# Patient Record
Sex: MALE | Race: BLACK OR AFRICAN AMERICAN | NOT HISPANIC OR LATINO | Employment: OTHER | URBAN - METROPOLITAN AREA
[De-identification: names, ages, dates, MRNs, and addresses within clinical notes are randomized per-mention and may not be internally consistent; named-entity substitution may affect disease eponyms.]

---

## 2017-11-14 ENCOUNTER — TELEPHONE (OUTPATIENT)
Dept: TRANSPLANT | Facility: CLINIC | Age: 67
End: 2017-11-14

## 2017-12-01 DIAGNOSIS — Z76.82 ORGAN TRANSPLANT CANDIDATE: Primary | ICD-10-CM

## 2017-12-19 ENCOUNTER — HOSPITAL ENCOUNTER (OUTPATIENT)
Dept: RADIOLOGY | Facility: HOSPITAL | Age: 67
Discharge: HOME | End: 2017-12-19
Attending: NURSE PRACTITIONER
Payer: MEDICARE

## 2017-12-19 ENCOUNTER — OFFICE VISIT (OUTPATIENT)
Dept: TRANSPLANT | Facility: CLINIC | Age: 67
End: 2017-12-19
Payer: MEDICARE

## 2017-12-19 ENCOUNTER — CLINICAL SUPPORT (OUTPATIENT)
Dept: INFECTIOUS DISEASES | Facility: CLINIC | Age: 67
End: 2017-12-19
Payer: MEDICARE

## 2017-12-19 VITALS
RESPIRATION RATE: 18 BRPM | TEMPERATURE: 98 F | SYSTOLIC BLOOD PRESSURE: 151 MMHG | BODY MASS INDEX: 36.99 KG/M2 | DIASTOLIC BLOOD PRESSURE: 75 MMHG | OXYGEN SATURATION: 95 % | WEIGHT: 244.06 LBS | HEIGHT: 68 IN | HEART RATE: 72 BPM

## 2017-12-19 DIAGNOSIS — N25.81 SECONDARY HYPERPARATHYROIDISM OF RENAL ORIGIN: ICD-10-CM

## 2017-12-19 DIAGNOSIS — E55.9 VITAMIN D DEFICIENCY DISEASE: ICD-10-CM

## 2017-12-19 DIAGNOSIS — I12.9 RENAL HYPERTENSION: Chronic | ICD-10-CM

## 2017-12-19 DIAGNOSIS — E03.9 HYPOTHYROIDISM, UNSPECIFIED TYPE: ICD-10-CM

## 2017-12-19 DIAGNOSIS — K63.5 POLYP OF COLON, UNSPECIFIED PART OF COLON, UNSPECIFIED TYPE: ICD-10-CM

## 2017-12-19 DIAGNOSIS — Z76.82 ORGAN TRANSPLANT CANDIDATE: ICD-10-CM

## 2017-12-19 DIAGNOSIS — Z01.818 PRE-TRANSPLANT EVALUATION FOR CHRONIC KIDNEY DISEASE: ICD-10-CM

## 2017-12-19 DIAGNOSIS — I25.10 CORONARY ARTERY DISEASE INVOLVING NATIVE CORONARY ARTERY OF NATIVE HEART WITHOUT ANGINA PECTORIS: Chronic | ICD-10-CM

## 2017-12-19 DIAGNOSIS — N18.5 CHRONIC KIDNEY DISEASE (CKD), STAGE V: ICD-10-CM

## 2017-12-19 DIAGNOSIS — Z01.818 PRE-TRANSPLANT EVALUATION FOR CHRONIC KIDNEY DISEASE: Primary | ICD-10-CM

## 2017-12-19 PROCEDURE — 72170 X-RAY EXAM OF PELVIS: CPT | Mod: 26,TXP,, | Performed by: RADIOLOGY

## 2017-12-19 PROCEDURE — 76770 US EXAM ABDO BACK WALL COMP: CPT | Mod: 26,TXP,, | Performed by: RADIOLOGY

## 2017-12-19 PROCEDURE — 99204 OFFICE O/P NEW MOD 45 MIN: CPT | Mod: S$GLB,TXP,, | Performed by: PHYSICIAN ASSISTANT

## 2017-12-19 PROCEDURE — G0010 ADMIN HEPATITIS B VACCINE: HCPCS | Mod: S$GLB,TXP,,

## 2017-12-19 PROCEDURE — 71020 XR CHEST PA AND LATERAL: CPT | Mod: 26,TXP,, | Performed by: RADIOLOGY

## 2017-12-19 PROCEDURE — 99999 PR PBB SHADOW E&M-EST. PATIENT-LVL III: CPT | Mod: PBBFAC,TXP,, | Performed by: INTERNAL MEDICINE

## 2017-12-19 PROCEDURE — 72170 X-RAY EXAM OF PELVIS: CPT | Mod: TC,TXP

## 2017-12-19 PROCEDURE — 90740 HEPB VACC 3 DOSE IMMUNSUP IM: CPT | Mod: S$GLB,TXP,,

## 2017-12-19 PROCEDURE — 76770 US EXAM ABDO BACK WALL COMP: CPT | Mod: TC,TXP

## 2017-12-19 PROCEDURE — 71020 XR CHEST PA AND LATERAL: CPT | Mod: TC,TXP

## 2017-12-19 PROCEDURE — 99204 OFFICE O/P NEW MOD 45 MIN: CPT | Mod: S$GLB,TXP,, | Performed by: TRANSPLANT SURGERY

## 2017-12-19 PROCEDURE — 93978 VASCULAR STUDY: CPT | Mod: TC,TXP

## 2017-12-19 PROCEDURE — 90632 HEPA VACCINE ADULT IM: CPT | Mod: S$GLB,TXP,,

## 2017-12-19 PROCEDURE — 90471 IMMUNIZATION ADMIN: CPT | Mod: S$GLB,TXP,,

## 2017-12-19 PROCEDURE — 99205 OFFICE O/P NEW HI 60 MIN: CPT | Mod: S$GLB,TXP,, | Performed by: INTERNAL MEDICINE

## 2017-12-19 PROCEDURE — 93978 VASCULAR STUDY: CPT | Mod: 26,TXP,, | Performed by: RADIOLOGY

## 2017-12-19 PROCEDURE — 97802 MEDICAL NUTRITION INDIV IN: CPT | Mod: S$GLB,TXP,, | Performed by: DIETITIAN, REGISTERED

## 2017-12-19 PROCEDURE — 90670 PCV13 VACCINE IM: CPT | Mod: S$GLB,TXP,,

## 2017-12-19 PROCEDURE — G0009 ADMIN PNEUMOCOCCAL VACCINE: HCPCS | Mod: S$GLB,TXP,,

## 2017-12-19 RX ORDER — LEVOTHYROXINE SODIUM 25 UG/1
25 TABLET ORAL DAILY
COMMUNITY

## 2017-12-19 RX ORDER — METOPROLOL SUCCINATE 200 MG/1
200 TABLET, EXTENDED RELEASE ORAL DAILY
COMMUNITY

## 2017-12-19 RX ORDER — AMIODARONE HYDROCHLORIDE 200 MG/1
TABLET ORAL DAILY
COMMUNITY

## 2017-12-19 RX ORDER — SPIRONOLACTONE 50 MG/1
50 TABLET, FILM COATED ORAL DAILY
COMMUNITY

## 2017-12-19 RX ORDER — NIFEDIPINE 90 MG/1
90 TABLET, EXTENDED RELEASE ORAL DAILY
COMMUNITY

## 2017-12-19 RX ORDER — HYDRALAZINE HYDROCHLORIDE 100 MG/1
100 TABLET, FILM COATED ORAL 3 TIMES DAILY
COMMUNITY

## 2017-12-19 RX ORDER — FERROUS SULFATE 325(65) MG
325 TABLET, DELAYED RELEASE (ENTERIC COATED) ORAL DAILY
COMMUNITY

## 2017-12-19 RX ORDER — FUROSEMIDE 40 MG/1
40 TABLET ORAL DAILY
COMMUNITY

## 2017-12-19 RX ORDER — ASPIRIN 81 MG/1
81 TABLET ORAL DAILY
COMMUNITY

## 2017-12-19 RX ORDER — ATORVASTATIN CALCIUM 40 MG/1
40 TABLET, FILM COATED ORAL DAILY
COMMUNITY

## 2017-12-19 NOTE — PATIENT INSTRUCTIONS
From your doctor:  Thank you for visiting us today and considering kidney transplantation.     -Please get records from you cardiologists' office (progress notes, any testing)  -Plan to gradually increase your physical activity after discussing with your heart doctor.  -Please feel free to contact us with any questions or concerns.    Regards,  Dr. Kamilah Kelly

## 2017-12-19 NOTE — LETTER
December 20, 2017        Joao Brooks  [address]  [address]  [place] [address]  Phone: [phone]  Fax: [phone]             [facility]  [address]  [place] [address]  Phone: [phone]   Patient: Julius Suazo   MR Number: [id number]   YOB: 1950   Date of Visit: 12/19/2017       Dear Dr. Joao Brooks    Thank you for referring Julius Suazo to me for evaluation. Attached you will find relevant portions of my assessment and plan of care.    If you have questions, please do not hesitate to call me. I look forward to following Julius Suazo along with you.    Sincerely,    Nidhi Wyman MD    Enclosure    If you would like to receive this communication electronically, please contact [email or web address] or [phone] to request [employer] Link access.    [employer] Link is a tool which provides read-only access to select patient information with whom you have a relationship. Its easy to use and provides real time access to review your patients record including encounter summaries, notes, results, and demographic information.    If you feel you have received this communication in error or would no longer like to receive these types of communications, please e-mail [email or web address]

## 2017-12-19 NOTE — PROGRESS NOTES
PHARM.D. PRE-TRANSPLANT NOTE:    This patient's medication therapy was evaluated as part of his pre-transplant evaluation.    The following pharmacologic concerns were noted: none      Current Outpatient Prescriptions   Medication Sig Dispense Refill    amiodarone (PACERONE) 200 MG Tab Take by mouth once daily.      aspirin (ECOTRIN) 81 MG EC tablet Take 81 mg by mouth once daily.      atorvastatin (LIPITOR) 40 MG tablet Take 40 mg by mouth once daily.      ferrous sulfate 325 (65 FE) MG EC tablet Take 325 mg by mouth once daily.      furosemide (LASIX) 40 MG tablet Take 40 mg by mouth once daily.      hydrALAZINE (APRESOLINE) 100 MG tablet Take 100 mg by mouth 3 (three) times daily.      levothyroxine (SYNTHROID) 25 MCG tablet Take 25 mcg by mouth once daily.      linagliptin (TRADJENTA) 5 mg Tab tablet Take 5 mg by mouth once daily.      metoprolol succinate (TOPROL-XL) 200 MG 24 hr tablet Take 200 mg by mouth once daily.      NIFEdipine (PROCARDIA-XL) 90 MG (OSM) 24 hr tablet Take 90 mg by mouth once daily.      spironolactone (ALDACTONE) 50 MG tablet Take 50 mg by mouth once daily.       No current facility-administered medications for this visit.          Currently he is responsible for preparing / administering this patient's medications on a daily basis.  I am available for consultation and can be contacted, as needed by the other members of the Kidney Transplant team.

## 2017-12-19 NOTE — PROGRESS NOTES
Transplant Nephrology  Kidney Transplant Recipient Evaluation    Referring Physician: Joao Brooks  Current Nephrologist: Joao Brooks    Subjective:   CC:  Initial evaluation of kidney transplant candidacy.    HPI:  Mr. Sauzo is a 67 y.o. year old Black or  male who has presented to be evaluated as a potential kidney transplant recipient.  He has advanced kidney disease secondary to diabetic nephropathy and HTN. Patient is currently pre-dialysis. He has a LUE AV fistula for dialysis access, but has not yet started.   He reports he gets tired easily and stays on couch most of the time. He is independent in ADLs, but does not exercise or walk much. He makes a lot of urine and denies any history of  issues such as UTIs or kidney stones. Julius reports no LUTS. He denies h/o prostate problems.  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 He does not follow with podiatry.  His EMR notes CHF, but notes are not clear if this is cardiac in origin; he noted having a lot of swelling that improved when he saw kidney doctor.    Previous Transplant: no    Past Medical and Surgical History: Mr. Suazo  has no past medical history on file.  He has a past surgical history that includes Coronary artery bypass graft (2015); Hernia repair (01/1980); and Tonsillectomy.    Past Social and Family History: Mr. Suazo reports that he has never smoked. He has never used smokeless tobacco. He reports that he drinks alcohol. He reports that he does not use drugs. His family history includes Diabetes in his mother; Hypertension in his father and mother; Kidney disease in his brother.    Review of Systems   Constitutional: Positive for fatigue. Negative for unexpected weight change.   HENT: Negative for hearing loss and mouth sores.    Eyes: Positive for visual disturbance (poor sight).   Respiratory:  "Negative for shortness of breath.    Cardiovascular: Negative for chest pain.   Gastrointestinal: Negative for abdominal pain, nausea and vomiting.   Genitourinary: Negative for decreased urine volume and dysuria.   Musculoskeletal: Positive for back pain. Negative for gait problem.   Skin: Negative for rash.   Neurological: Negative for seizures.   Hematological: Does not bruise/bleed easily.   Psychiatric/Behavioral: Negative for sleep disturbance.       Objective:   Blood pressure (!) 151/75, pulse 72, temperature 97.9 °F (36.6 °C), temperature source Oral, resp. rate 18, height 5' 8.11" (1.73 m), weight 110.7 kg (244 lb 0.8 oz), SpO2 95 %.body mass index is 36.99 kg/m².    Physical Exam   Constitutional: He is oriented to person, place, and time. He is cooperative. No distress.   HENT:   Mouth/Throat: Mucous membranes are normal. No oral lesions.   Eyes: Conjunctivae and lids are normal. No scleral icterus.   Neck: Trachea normal. Neck supple. No JVD present. No thyroid mass present.   Cardiovascular: Normal rate and regular rhythm.    Pulmonary/Chest: Effort normal. He has no rales.   Abdominal: Soft. He exhibits no mass. There is no hepatosplenomegaly. There is no tenderness. There is no CVA tenderness.   abd obese and protuberant   Musculoskeletal: Normal range of motion. He exhibits no edema.   Neurological: He is alert and oriented to person, place, and time. He displays no tremor. Gait normal.   Skin: Skin is warm and dry. No lesion and no rash noted. No cyanosis. Nails show no clubbing.   Psychiatric: He has a normal mood and affect. His speech is normal. Cognition and memory are normal.     Labs:  Lab Results   Component Value Date    WBC 8.03 12/19/2017    HGB 12.2 (L) 12/19/2017    HCT 37.5 (L) 12/19/2017     12/19/2017    K 4.9 12/19/2017     12/19/2017    CO2 21 (L) 12/19/2017    BUN 63 (H) 12/19/2017    CREATININE 5.6 (H) 12/19/2017    EGFRNONAA 9.7 (A) 12/19/2017    CALCIUM 9.0 " 12/19/2017    PHOS 4.1 12/19/2017    ALBUMIN 3.5 12/19/2017    AST 18 12/19/2017    ALT 27 12/19/2017    .0 (H) 12/19/2017   Labs were reviewed with the patient.    Assessment:     1. Pre-transplant evaluation for chronic kidney disease    2. Chronic kidney disease (CKD), stage V    3. Coronary artery disease involving native coronary artery of native heart without angina pectoris    4. Renal hypertension    5. Secondary hyperparathyroidism of renal origin    6. Vitamin D deficiency disease        Plan:     Transplant Candidacy:   Based on available information, Mr. Suazo is a high-risk kidney transplant candidate r/t age, CAD, lack of mobility. To his credit, he is motivated to improve his functional status and has been asked to get clearance from his heart doctor prior to increasing his physical activity.  -Records from cardiologist and past colonoscopy will be requested.  Final determination of transplant candidacy will be made once workup is complete and reviewed by the selection committee.    MD TONEY Partida Patient Status  Functional Status: 50% - Requires considerable assistance and frequent medical care  Physical Capacity: No Limitations though does not walk much

## 2017-12-19 NOTE — PROGRESS NOTES
Transplant Surgery  Kidney Transplant Recipient Evaluation    Referring Physician: Joao Brooks  Current Nephrologist: Joao Brooks    Subjective:     Reason for Visit: evaluate transplant candidacy    History of Present Illness: Julius Suazo is a 67 y.o. year old male undergoing transplant evaluation.    Dialysis History: Julius is pre-dialysis.      Transplant History: N/A    Etiology of Renal Disease: Hypertensive Nephrosclerosis (based on medical records from referral).    Review of Systems   Constitutional: Negative for activity change and appetite change.   HENT: Negative for congestion and tinnitus.    Eyes: Negative for redness and visual disturbance.   Respiratory: Negative for cough and shortness of breath.    Cardiovascular: Negative for chest pain and palpitations.   Gastrointestinal: Negative for abdominal distention and abdominal pain.   Endocrine: Negative for polydipsia and polyuria.   Genitourinary: Negative for decreased urine volume and dysuria.   Musculoskeletal: Negative for arthralgias and back pain.   Skin: Negative for pallor and rash.   Allergic/Immunologic: Negative for environmental allergies and immunocompromised state.   Neurological: Negative for tremors and headaches.   Hematological: Negative for adenopathy. Does not bruise/bleed easily.   Psychiatric/Behavioral: Negative for behavioral problems and confusion.       Objective:     Physical Exam:  Constitutional:   Vitals reviewed: yes   Well-nourished and well-groomed: yes  Eyes:   Sclerae icteric: no   Extraocular movements intact: yes  GI:    Bowel sounds normal: yes   Tenderness: no    If yes, quadrant/location: not applicable   Palpable masses: no    If yes, quadrant/location: not applicable   Hepatosplenomegaly: no   Ascites: no   Hernia: no    If yes, type/location: not applicable   Surgical scars: no    If yes, type/location: not applicable  Resp:   Effort normal: yes   Breath sounds normal: yes    CV:   Regular  rate and rhythm: yes   Heart sounds normal: yes   Femoral pulses normal: yes   Extremities edematous: no  Skin:   Rashes or lesions present: no    If yes, describe:not applicable   Jaundice:: no    Musculoskeletal:   Gait normal: yes   Strength normal: yes  Psych:   Oriented to person, place, and time: yes   Affect and mood normal: yes    Additional comments: Sternotomy from triple bypass    Counseling: We provided Julius Suazo with a group education session today.  We discussed kidney transplantation at length with him, including risks, potential complications, and alternatives in the management of his renal failure.  The discussion included complications related to anesthesia, bleeding, infection, primary nonfunction, and ATN.  I discussed the typical postoperative course, length of hospitalization, the need for long-term immunosuppression, and the need for long-term routine follow-up.  I discussed living-donor and -donor transplantation and the relative advantages and disadvantages of each.  I also discussed average waiting times for both living donation and  donation.  I discussed national and center-specific survival rates.  I also mentioned the potential benefit of multicenter listing to candidates listed with centers within more than one organ procurement organization.  All questions were answered.    Final determination of transplant candidacy will be made once evaluation is complete and reviewed by the Kidney & Kidney/Pancreas Selection Committee.         Transplant Surgery - Candidacy   Assessment/Plan:   Julius Suazo is pre-dialysis with CKD stage 4 (GFR 15-29 mL/min). I see no surgical contraindication to placing a kidney transplant. Based on available information, Julius Suazo is a suitable kidney transplant candidate. I strongly encouraged weight loss and living donation if he is approved for transplant.    Garcia Lyles MD

## 2017-12-19 NOTE — PROGRESS NOTES
INITIAL PATIENT EDUCATION NOTE    Mr. Julius Suazo was seen in pre-kidney transplant clinic for evaluation for kidney, kidney/pancreas or pancreas only transplant.  The patient attended a group education session that discussed/reviewed the following aspects of transplantation: evaluation and selection committee process, UNOS waitlist management/multiple listings, types of organs offered (KDPI < 85%, KDPI > 85%, PHS increased risk, DCD), financial aspects, surgical procedures, dietary instruction pre- and post-transplant, health maintenance pre- and post-transplant, post-transplant hospitalization and outpatient follow-up, potential to participate in a research protocol, and medication management and side effects.  A question and answer session was provided after the presentation.    The patient was seen by all members of the multi-disciplinary team to include: Nephrologist/PA, Surgeon, , Transplant Coordinator, , Pharmacist and Dietician (if applicable).    The patient reviewed and signed all consents for evaluation which were witnessed and sent to scanning into the EPIC chart.    The patient was given an education book and plan for further evaluation based on his individual assessment.      The patient was encouraged to call with any questions or concerns.

## 2017-12-19 NOTE — PROGRESS NOTES
"TRANSPLANT NUTRITIONAL ASSESSMENT    Referring Provider: Nidhi Fitzgerald MD    Reason for Visit: Pre-kidney transplant work-up (pre-dialysis)    Age: 67 y.o.  Sex: male    Patient Active Problem List   Diagnosis    CKD stage V from diabetic nephropathy    CAD of native coronary artery of native heart    Renal hypertension    Secondary hyperparathyroidism of renal origin    Vitamin D deficiency disease    Colon polyp 8/2017    Hypothyroidism     Past Medical History:   Diagnosis Date    CHF (congestive heart failure)     Hypothyroidism      Lab Results   Component Value Date     (H) 12/19/2017    K 4.9 12/19/2017    PHOS 4.1 12/19/2017    CHOL 121 12/19/2017    HDL 37 (L) 12/19/2017    TRIG 96 12/19/2017    ALBUMIN 3.5 12/19/2017    HGBA1C 5.9 (H) 12/19/2017    CALCIUM 9.0 12/19/2017       Current Outpatient Prescriptions   Medication Sig    amiodarone (PACERONE) 200 MG Tab Take by mouth once daily.    aspirin (ECOTRIN) 81 MG EC tablet Take 81 mg by mouth once daily.    atorvastatin (LIPITOR) 40 MG tablet Take 40 mg by mouth once daily.    ferrous sulfate 325 (65 FE) MG EC tablet Take 325 mg by mouth once daily.    furosemide (LASIX) 40 MG tablet Take 40 mg by mouth once daily.    hydrALAZINE (APRESOLINE) 100 MG tablet Take 100 mg by mouth 3 (three) times daily.    levothyroxine (SYNTHROID) 25 MCG tablet Take 25 mcg by mouth once daily.    linagliptin (TRADJENTA) 5 mg Tab tablet Take 5 mg by mouth once daily.    metoprolol succinate (TOPROL-XL) 200 MG 24 hr tablet Take 200 mg by mouth once daily.    NIFEdipine (PROCARDIA-XL) 90 MG (OSM) 24 hr tablet Take 90 mg by mouth once daily.    spironolactone (ALDACTONE) 50 MG tablet Take 50 mg by mouth once daily.     No current facility-administered medications for this visit.      Allergies: Patient has no known allergies.    Ht Readings from Last 1 Encounters:   12/19/17 5' 8.11" (1.73 m)     Wt Readings from Last 1 Encounters:   12/19/17 " 110.7 kg (244 lb 0.8 oz)      BMI: Body mass index is 36.99 kg/m².    Weight Change/Time: no significant wt change reported  Current Diet: renal, diabetic diet per pt  Appetite/Current Intake: good   Exercise/Physical Activity: sedentary, but plans to start exercising  Nutritional/Herbal Supplements: iron  Chewing/Swallowing Problems: none reported  Symptoms: none    Estimated Kcal Need: 2200kcals/day (20kcals/kg BW for wt loss)  Estimated Protein Need: 88gms protein/day (0.8gms/kg BW)    Nutritional History: Pt here today with his wife, Dennise.  Dennise prepares meals and grocery shops.  Does not use salt in cooking, typically uses herbs and spices to season foods.  Tries to limit intake of high Na foods but does report consumption of processed meats.  Dining out/fast food: consumes fast foods approx twice per week.  SMBG: every morning, averages approx 130    Diet Recall:  B: grits, sausage, eggs OR [employer] sausage burrito, coffee/juice/or water  Mid-morning snack: sometimes may have fresh fruit (strawberries/grapes/cantaloupe) or fruit cup  L: leftovers from night before, water or ginger ale  Afternoon snack: fresh fruit  D: red beans with sausage, rice, dinner roll OR fried or baked chicken, greens/green beans/or salad, water or ginger ale      Educational Need? yes  Barriers: none identified  Discussed with: patient and wife  Interventions: Patient taught nutrition information regarding   -Diabetes and CKD Nutrition Therapy reviewed ( high/low food sources of K, Phos and protein, low sodium and fluid intake, emphasis on moderate protein intake, basic DM guidelines, carb counting, 2-day sample meal plan)   Goals/Recommendations: diet adherence, choose healthy options when dining out, aerobic exercises as medically able and limit intake of sugar-sweetened beverages and high Na processed meats  Actions Taken: instruct/provide written information  Patient and/or family comprehend instructions: yes  Outcome:  Verbalizes understanding  Monitoring: contact information provided, will follow-up as needed    Counseling Time: 15 minutes

## 2017-12-19 NOTE — PROGRESS NOTES
Pt received his immunizations (Hepatitis A, Hepatitis B high dose, Prevnar 13). Pt has an Rx to receive the rest of his immunizations at a facility closer to home. Pt tolerated injections well. Pt left the unit in NAD.

## 2017-12-19 NOTE — PROGRESS NOTES
Pre Transplant Infectious Diseases Consult  Kidney Transplant Recipient Evaluation    Requesting Physician: Joao Brooks    Reason for Visit:    Chief Complaint   Patient presents with    Kidney Transplant Pre-evaluation     History of Present Illness  Julius Suazo is a 67 y.o. year old Black or  male with advanced Kidney disease currently being evaluated for Kidney transplant. Etiology of kidney disease is HTN and DM. Pt is pre dialysis.  Patient denies any recent fever, chills, or infective illnesses.      1) Do you have a history of:   YES NO   Diabetes      [x] []     Diabetic Foot Infection/Bone Infection  []        [x]     Surgical Removal of Spleen   []  [x]                  2) Have you had recurrent infections involving:             YES NO  Sinus infections  []         [x]   Sore Throat   []         [x]                 Prostate Infections  []         [x]              Bladder Infections  []         [x]                     Kidney Infections  []         [x]                               Intestinal Infections  []         [x]      Skin Infections   []         [x]       Reproductive Infections          []  [x]   Periodontal Disease  []         [x]        3)Have you ever had: YES     NO UNKNOWN       Chicken Pox   [x]         []  []   Shingles   []         [x]  []   Orolabial Herpes             []  [x]  []   Genital Herpes  []         [x]  []   Cytomegalovirus  []         [x]  []   Angelo-Barr Virus  []         [x]  []   Genital Warts   []         [x]  []   Hepatitis A   []         [x]  []   Hepatitis B   []         [x]  []   Hepatitis C   []         [x]  []   Syphilis   []         [x]  []   Gonorrhea   []         [x]  []   Chlamydia Infection  []         [x]  []   Intestinal parasites   or worms   []         [x]  []   Fungal Infections  []         [x]  []   Blood Infections  []         [x]  []       Comment:     4) Have you ever been exposed   YES NO  To someone with tuberculosis?  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 Pt was treated for 12 months. Pt was treated in [facility].     5) What states have you lived in? [place]     6) What countries have you visited for more than 2 weeks? none                        YES NO  7) Did you have any associated infections?  []  [x]       8) Are you planning to travel outside the    []  [x]   [place] after your transplant?    9) Household                   YES NO  Do you have pets living in your house?    []         [x]   If yes, describe:     Do you spend time or live on a farm or     []         [x]   have livestock or other farm animals?  If yes, which ones:    Do you have a fish tank?          []  [x]       Do you have a litter box?      []         [x]     Do you fish or hunt?       []         [x]     Do you clean or skin fish or animals?    []         [x]     Do you consume raw or undercooked    []         [x]   meat, fish, or shellfish?      10) What occupations have you had? none    11) Patient reports hobby to be none          12)Do you garden or otherwise  YES NO   work in the soil?    []         [x]   13)Do you hike, camp, or spend     time in wooded areas?   []         [x]        14) The patient's immunization history was reviewed.    Have you ever received:  YES NO UNKNOWN DATES   Routine Childhood vaccines  [x]         []  []      Influenza vaccine   [x]  []  []    Pneumovax    [x]  []  [] 10/11/15    Tetanus-diptheria   [x]         []  [] 10/11/15   Hepatitis A vaccine series       []  [x]  []    Hepatitis B vaccine series         []  [x]  []    Meningitis vaccine   []         [x]  []    Varicella vaccine   []         [x]  []        Based on the patients immunization history and serologies, immunizations were ordered:         Ordered  Not Ordered  Influenza Vaccine     []    [x]   Hepatitis A series at 0,  6 months   [x]    []   Hepatitis B seriesat 0, 1, and 6  months  []    [x]   Hepatitis B High Dose 0,1, and 6 months  [x]    []   Prevnar      [x]    []   Pneumovax      []    [x]    TDap       []    [x]    Zoster       []    [x]   Menactra      []    [x]            The patient was encouraged to contact us about any problems that may develop after immunization and possible side effects were reviewed.      Previous Transplant: no    Etiology of Kidney Disease: HTN and DM     Allergies: Patient has no known allergies.    There is no immunization history on file for this patient.  No past medical history on file.  No past surgical history on file.   Social History     Social History    Marital status:      Spouse name: N/A    Number of children: N/A    Years of education: N/A     Occupational History    Not on file.     Social History Main Topics    Smoking status: Not on file    Smokeless tobacco: Not on file    Alcohol use Not on file    Drug use: Unknown    Sexual activity: Not on file     Other Topics Concern    Not on file     Social History Narrative    No narrative on file       Review of Systems   Constitution: Negative for chills, decreased appetite, fever, weakness, malaise/fatigue, night sweats, weight gain and weight loss.   HENT: Negative for congestion, ear pain, hearing loss, hoarse voice, sore throat and tinnitus.    Eyes: Negative for blurred vision, pain, vision loss in left eye, vision loss in right eye and visual disturbance.   Cardiovascular: Negative for chest pain, dyspnea on exertion, leg swelling and palpitations.   Respiratory: Negative for cough, shortness of breath, sputum production and wheezing.    Skin: Negative for dry skin, itching, rash and suspicious lesions.   Musculoskeletal: Negative for back pain, joint pain, myalgias and neck pain.   Gastrointestinal: Negative for abdominal pain, constipation, diarrhea, heartburn, nausea and vomiting.   Genitourinary: Negative for dysuria, flank pain, frequency, hematuria, hesitancy  and urgency.   Neurological: Negative for dizziness, headaches, numbness and paresthesias.   Psychiatric/Behavioral: Negative for depression and memory loss. The patient does not have insomnia and is not nervous/anxious.      Physical Exam   Constitutional: He is oriented to person, place, and time. He appears well-developed and well-nourished. No distress.   HENT:   Head: Normocephalic and atraumatic.   Mouth/Throat: Oropharynx is clear and moist.   Eyes: EOM are normal. Pupils are equal, round, and reactive to light.   Neck: Normal range of motion. Neck supple.   Cardiovascular: Normal rate, regular rhythm, normal heart sounds and intact distal pulses.  Exam reveals no gallop and no friction rub.    No murmur heard.  Pulmonary/Chest: Effort normal and breath sounds normal. No respiratory distress. He has no wheezes. He has no rales. He exhibits no tenderness.   Abdominal: Bowel sounds are normal. He exhibits no distension and no mass. There is no tenderness. There is no guarding.   Musculoskeletal: Normal range of motion. He exhibits no edema or deformity.   Neurological: He is alert and oriented to person, place, and time.   Skin: Skin is warm and dry. No rash noted. He is not diaphoretic. No erythema.   Right foot hallux with wound, no active drainage, nontender, no surrounding swelling or cellulitis. No foul odor.    Psychiatric: He has a normal mood and affect. His behavior is normal. Judgment and thought content normal.   Nursing note and vitals reviewed.    Diagnostics: No results found for: RPR  No results found for: CMVANTIBODIE  No results found for: HIV1X2  No results found for: HTLVIIIANTIB  No results found for: HEPBSAG  No results found for: HEPBCAB  No results found for: HEPCAB  No results found for: TOXOIGG  No components found for: TOXOIGGINTER  No results found for: LFI9CKT  No results found for: VTA1MHY  No results found for: VARICELLAZOS  No results found for: VARICELLAINT  No results found for:  STRONGANTIGG  No results found for: EPSTEINBARRV  No results found for: HEPBSAB  No results found for: QUANTIFERON  No results found for: HEPAIGM  No results found for: PPD  No results found for this or any previous visit.         Transplant Infectious Diseases - Candidacy    Assessment/Plan:     Transplant Candidacy: Based on available information, there are no identified significant barriers to transplantation from an infectious disease standpoint pending acceptable serologies.  Final determination of transplant candidacy will be made once evaluation is complete and reviewed by the Transplant Selection Committee.    Quantiferon gold, HIV, Strongyloides, and RPR pending. If positive, please refer to ID clinic.    Pt with right hallux wound, no active signs of infection at this time. Recommend follow up with podiatry for evaluation of right hallux wound and management.      Vaccines today:  Hepatitis A first dose. 2nd dose due in 6 months  Hepatitis B first dose. 2nd dose due in 1 month, 3rd dose due in 6 months.  Katerin Szymanski PA-C         Counseling:I discussed with Julius the risk for increased susceptibility to infections following transplantation including increased risk for infection right after transplant and if rejection should occur.  The patients has been counseled on the importance of vaccinations including but not limited to a yearly flu vaccine.  Specific guidance has been provided to the patient regarding the patients occupation, hobbies and activities to avoid future infectious complications including but not limited to avoiding undercooked meats and seafood, proper hygiene, and contact with animals.

## 2017-12-20 NOTE — PROGRESS NOTES
Transplant Recipient Adult Psychosocial Assessment    Julius [place] [address]    Telephone Information:   Mobile [phone]   Home  [phone] (home)  Work  There is no work phone number on file.  E-mail  No e-mail address on record    Sex: male  YOB: 1950  Age: 67 y.o.    Encounter Date: 2017  [place] Citizen: yes  Primary Language: English   Needed: no    Emergency Contact:  Name: Marcelle Suazo  Relationship: wife  Address: same as pt.  Phone Numbers:  [phone] (home), [phone] (mobile)    Family/Social Support:   Number of dependents/: Pt reports no dependents.  Marital history: Pt reports 1 marriage of 42 years to Marcelle Suazo.  Other family dynamics: Pt reports 3 adult children: Olena, Osmin, and Geovanna; and parents: Brenda and Garcia Suazo (age 90). Pt reports that parents require no assistance, but other family members can help if needed. Pt reports only brother is .    Household Composition:  Name: Julius suazo  Age: 67  Relationship: patient  Does person drive? yes    Name: Marcelle Suazo  Age: 65  Relationship: wife  Does person drive? yes    Do you and your caregivers have access to reliable transportation? yes  PRIMARY CAREGIVER: Marcelle Suazo (wife) will be primary caregiver, phone number [phone].      provided in-depth information to patient and caregiver regarding pre- and post-transplant caregiver role.   strongly encourages patient and caregiver to have concrete plan regarding post-transplant care giving, including back-up caregiver(s) to ensure care giving needs are met as needed.    Patient and Caregiver states understanding all aspects of caregiver role/commitment and is able/willing/committed to being caregiver to the fullest extent necessary.    Patient and Caregiver verbalizes understanding of the education provided today and caregiver responsibilities.          remains available. Patient and Caregiver agree to contact  in a timely manner if concerns arise.      Able to take time off work without financial concerns: yes, pt's wife is retired.     Additional Significant Others who will Assist with Transplant:  Name: Geovanna Suazo  Age: 40  Phone: [phone]  City: [place] State: [place]  Relationship: daughter  Does person drive? yes    Living Will: no  Healthcare Power of : no  Advance Directives on file: <<no information> per medical record.  Verbally reviewed LW/HCPA information.   provided patient with copy of LW/HCPA documents and provided education on completion of forms.    Living Donors: No. Education and resource information given to patient.    Highest Education Level: Associate/Bachelor Degree  Reading Ability: college  Reports difficulty with: seeing and pt reports eyesight gets blurry sometimes  Learns Best By:  Pt reports pt learns best by a combination of verbal, written, and tactile instructions.     Status: no  VA Benefits: no     Working for Income: No  If no, reason not working: Patient Choice - Retired  Patient is retired from [employer] as a ..    Spouse/Significant Other Employment: Pt's wife reports being retired from [email or web address] in [place], [place] as a Payroll person    Disabled: no    Monthly Income:  alf: $250  SS Reitrement: $1800  Other household members total: $946     Able to afford all costs now and if transplanted, including medications: yes Pt reports family and friends can assist if necessary  Patient and Caregiver verbalizes understanding of personal responsibilities related to transplant costs and the importance of having a financial plan to ensure that patients transplant costs are fully covered.       provided fundraising information/education. Patient and Caregiververbalizes understanding.   remains  available.    Insurance:   Payor/Plan Subscr  Sex Relation Sub. Ins. ID Effective Group Num   1. [employer] MANAGE* APURVA VERMA 1950 Male  [id number] 10/1/15 [id number]                                   [address] [address]     Primary Insurance (for UNOS reporting): Public Insurance - Medicare & Choice  Secondary Insurance (for UNOS reporting): None  Patient and Caregiver verbalizes clear understanding that patient may experience difficulty obtaining and/or be denied insurance coverage post-surgery. This includes and is not limited to disability insurance, life insurance, health insurance, burial insurance, long term care insurance, and other insurances.      Patient and Caregiver also reports understanding that future health concerns related to or unrelated to transplantation may not be covered by patient's insurance.  Resources and information provided and reviewed.     Patient and Caregiver provides verbal permission to release any necessary information to outside resources for patient care and discharge planning.  Resources and information provided are reviewed.      Dialysis Adherence: Patient reports being pre-dialysis and attending all appointments. AURORA Zacarias at pt's nephrology office reports over last three months that the patient has kept up with his medications, comes to his labs, attends his appointments and can make informed decisions.    Infusion Service: patient utilizing? no  Home Health: patient utilizing? no  DME: yes BP Cuff and glucometer  Pulmonary/Cardiac Rehab: Pt denies   ADLS:  Pt reports no difficulties with driving, walking, bathing, cooking, housekeeping, eating, shopping, and taking medication.    Adherence:   Pt reports good adherence with medications, dialysis, and health regimen.  Adherence education and counseling provided.     Per History Section:  Past Medical History:   Diagnosis Date    CHF (congestive heart failure)     Hypothyroidism      Social History   Substance Use  Topics    Smoking status: Never Smoker    Smokeless tobacco: Never Used    Alcohol use Yes      Comment: Pt reports previous heavy use of over 5 drinks a day for about 20-30 years. Pt reports that he cut back on his own after having heart surgery in Feb. 2015. Pt reports now drinking 1-2 drinks on the weekend.     History   Drug Use No     History   Sexual Activity    Sexual activity: Not on file       Per Today's Psychosocial:  Tobacco: none, patient denies any use.  Alcohol: Pt reports previous heavy use of over 5 drinks a day for about 20-30 years. Pt reports that he cut back on his own after having heart surgery in Feb. 2015. Pt reports now drinking 1-2 drinks on the weekend. .  Illicit Drugs/Non-prescribed Medications: none, patient denies any use.    Patient and Caregiver states clear understanding of the potential impact of substance use as it relates to transplant candidacy and is aware of possible random substance screening.  Substance abstinence/cessation counseling, education and resources provided and reviewed.     Arrests/DWI/Treatment/Rehab: patient denies    Psychiatric History:    Mental Health: Pt reports that he does not engage in the activities that he used to since he retired. Pt's wife reports that part of the issue is that pt is not able to hang out with the frineds that he used to and reports that the other friends he has work. SW, pt, and wife discussed engaging in more enjoyable activities and getting out of the house more. Patient and Caregiver verbalized understanding and agreement. SW also encouraged pt and wife to seek out assistance if pt feels that these feelings are overwhelming or start interfering with pt's life and health. Patient and Caregiver verbalized understanding and agreement.   Psychiatrist/Counselor: Pt denies seeing a mental health professional and reports being open to seeing the psych department for talk therapy if necessary.  Medications:  Pt denies taking  medications for mental health reasons.  Suicide/Homicide Issues: Pt denies any history of or current suicidal or homicidal ideations.    Safety at home: Pt reports no current or history of safety concerns in household; including mental, physical, verbal, or sexual abuse.    Knowledge: Patient and Caregiver states having clear understanding and realistic expectations regarding the potential risks and potential benefits of organ transplantation and organ donation and agrees to discuss with health care team members and support system members, as well as to utilize available resources and express questions and/or concerns in order to further facilitate the pt informed decision-making.  Resources and information provided and reviewed.    Patient and Caregiver is aware of [facility]'s affiliation and/or partnership with agencies in home health care, LTAC, SNF, [facility], and other hospitals and clinics.    Understanding: Patient and Caregiver reports having a clear understanding of the many lifetime commitments involved with being a transplant recipient, including costs, compliance, medications, lab work, procedures, appointments, concrete and financial planning, preparedness, timely and appropriate communication of concerns, abstinence (ETOH, tobacco, illicit non-prescribed drugs), adherence to all health care team recommendations, support system and caregiver involvement, appropriate and timely resource utilization and follow-through, mental health counseling as needed/recommended, and patient and caregiver responsibilities.  Social Service Handbook, resources and detailed educational information provided and reviewed.  Educational information provided.    Patient and Caregiver also reports current and expected compliance with health care regime and states having a clear understanding of the importance of compliance.      Patient and Caregiver reports a clear understanding that risks and benefits may be involved with organ  transplantation and with organ donation.       Patient and Caregiver also reports clear understanding that psychosocial risk factors may affect patient, and include but are not limited to feelings of depression, generalized anxiety, anxiety regarding dependence on others, post traumatic stress disorder, feelings of guilt and other emotional and/or mental concerns, and/or exacerbation of existing mental health concerns.  Detailed resources provided and discussed.      Patient and Caregiver agrees to access appropriate resources in a timely manner as needed and/or as recommended, and to communicate concerns appropriately.  Patient and Caregiver also reports a clear understanding of treatment options available.     Patient and Caregiver received education in a group setting.   reviewed education, provided additional information, and answered questions.    Feelings or Concerns: Patient and Caregiver did not express any concerns at this time.    Coping: Pt reports coping well with the transplant process at this time and reports spending time with family, especially parents as a way to cope. Pt reports [facility] as [facility] in [place], [place] with rev. Moncho Can presiding. Pt reports that he does not go as often as he feels he should and reports wanting to work on going more often.    Goals: Pt reports being more active and having more energy as goals for after transplant..  Patient referred to Vocational Rehabilitation.    Interview Behavior: Patient and Caregiver presents as alert and oriented x 4, pleasant, good eye contact, well groomed, recall good, concentration/judgement good, average intelligence, calm, communicative, cooperative and asking and answering questions appropriately. Pt presents with wife: Marcelle Suazo at pt's request.         Transplant Social Work - Candidacy  Assessment/Plan:     Psychosocial Suitability: Patient presents as a suitable candidate for kidney transplant  at this time. Based on psychosocial risk factors, patient presents as low risk, due to suitable caregiver plan, financial plan in place, and suitable adherence to medical regimen.    Recommendations/Additional Comments: SW recommends that pt conduct fundraising to assist pt with pay for cost of medications, food, gas, and other transplant related needs. SW recommends that pt remain aware of potential mental health concerns and contact the team if any concerns arise. SW recommends that pt remain abstinent from tobacco, ETOH, and drug use. SW supports pt's continued dialysis adherence. SW remains available to answer any questions or concerns that arise as the pt moves through the transplant process.     BRYSON MonterrosoW

## 2017-12-29 ENCOUNTER — DOCUMENTATION ONLY (OUTPATIENT)
Dept: TRANSPLANT | Facility: CLINIC | Age: 67
End: 2017-12-29

## 2017-12-29 NOTE — NURSING
Nurse spoke with Daria at [facility] [place] and informed her tat the 2DEcho, Stress Test and last MD notes was received. She was informed that the PA pressure was not included on the 2DEcho. She was asked if the doctor could re read the echo and give a PA pressure. We will also need a clearance letter from the doctor stating that the patient is cleared for kidney transplant from a Cardiac standpoint. Marcial verbalized understanding of the above information and states she will give the information to the nurse and doctor. Contact information and fax number provided if MD or nurse have any questions.

## 2018-01-04 ENCOUNTER — TELEPHONE (OUTPATIENT)
Dept: TRANSPLANT | Facility: CLINIC | Age: 68
End: 2018-01-04

## 2018-01-04 DIAGNOSIS — Z76.82 ORGAN TRANSPLANT CANDIDATE: Primary | ICD-10-CM

## 2018-01-04 NOTE — TELEPHONE ENCOUNTER
----- Message from Linh Allen sent at 1/2/2018  2:26 PM CST -----  Contact: Duran      ----- Message -----  From: Kelvin De Jesus  Sent: 1/2/2018   2:15 PM  To: [facility] Pre-Kidney Transplant Clinical    Calling to speak with Holli Ramirez.          Call back number: [phone]

## 2018-01-04 NOTE — TELEPHONE ENCOUNTER
Nurse spoke with Aura and she states she will fax over clearance letter and include the PA pressure for the 2DEcho done 4/13/17. Contact information and fax number provided.

## 2018-01-05 ENCOUNTER — TELEPHONE (OUTPATIENT)
Dept: TRANSPLANT | Facility: CLINIC | Age: 68
End: 2018-01-05

## 2018-01-05 NOTE — TELEPHONE ENCOUNTER
----- Message from Kelvin De Jesus sent at 1/5/2018  2:36 PM CST -----  Contact: Pt  Pt calling to speak with Nhung in regards to scheduling an apt to have test done.        Call back number: [phone]       Home: [phone]

## 2018-01-05 NOTE — TELEPHONE ENCOUNTER
Spoke to patient, he is requesting appts in [place] on Jan 12th if available. Will inform  to see if that date is available and she will call pt.

## 2018-01-12 ENCOUNTER — HOSPITAL ENCOUNTER (OUTPATIENT)
Dept: RADIOLOGY | Facility: HOSPITAL | Age: 68
Discharge: HOME | End: 2018-01-12
Attending: TRANSPLANT SURGERY
Payer: MEDICARE

## 2018-01-12 DIAGNOSIS — Z76.82 ORGAN TRANSPLANT CANDIDATE: ICD-10-CM

## 2018-01-12 PROCEDURE — 74176 CT ABD & PELVIS W/O CONTRAST: CPT | Mod: TC,TXP

## 2018-01-17 NOTE — PROGRESS NOTES
Extensive calcifications. Including moderate calcifications on the external iliac arteries, right worse than left.  Dopplers are acceptable.   Transplant appears feasible but high risk.

## 2018-02-15 ENCOUNTER — TELEPHONE (OUTPATIENT)
Dept: TRANSPLANT | Facility: CLINIC | Age: 68
End: 2018-02-15

## 2018-02-15 NOTE — TELEPHONE ENCOUNTER
Spoke to patient. She saw a Podiatrist today and will have an x-ray next week to rule out osteomyelitis. He has a foot wound. Will follow up with Dr. Herrera with [facility] in a couple of weeks to assess plan and obtain records. Pt states he had colonoscopy at [facility] in [place].

## 2018-04-05 ENCOUNTER — TELEPHONE (OUTPATIENT)
Dept: TRANSPLANT | Facility: CLINIC | Age: 68
End: 2018-04-05

## 2018-04-05 NOTE — TELEPHONE ENCOUNTER
Spoke to patient, still being treated for a foot ulcer. Will give another month review date. Pt also encouraged to follow up with Dr. Rodgers post colonoscopy so he can get a recall date. Pt no showed the appt on 11/7/17 with Dr. Rodgers, GI. Pt voiced understanding.

## 2018-07-11 ENCOUNTER — TELEPHONE (OUTPATIENT)
Dept: TRANSPLANT | Facility: CLINIC | Age: 68
End: 2018-07-11

## 2018-07-11 NOTE — TELEPHONE ENCOUNTER
Spoke to Mr. Suazo, states he is still being treated for foot ulcer. Saw Podiatrist on 7/10/18 and said the doctor said it is healing well and looks good. He has follow up appt in 3 weeks.     Pt states he still has not followed up with Dr. Rodgers for colonoscopy repeat recs. Had colonoscopy 10/2017 but Dr. Rogders wants to see him to give repeat recs. Encouraged patient to call to make appt. Pt voiced understanding.

## 2018-07-26 ENCOUNTER — TELEPHONE (OUTPATIENT)
Dept: TRANSPLANT | Facility: CLINIC | Age: 68
End: 2018-07-26

## 2018-07-26 NOTE — TELEPHONE ENCOUNTER
Left message for patient. Patient needed to reschedule his appointment with Dr. Rodgers in GI to get recs for repeat colonoscopy. Pt is also being treated for a foot wound with Dr. Herrera, [facility].

## 2018-09-05 ENCOUNTER — TELEPHONE (OUTPATIENT)
Dept: TRANSPLANT | Facility: CLINIC | Age: 68
End: 2018-09-05

## 2018-09-05 NOTE — TELEPHONE ENCOUNTER
Spoke to patient, he still has not followed up with Dr. Danie Rodgers post colonoscopy in October 2017 as recommended to get recall recommendations. Pt is also still being treated for a foot ulcer and he is due to see the Transplant Team. Awaiting most recent records from Dr. Herrera with Podiatry for foot ulcers.

## 2018-09-20 ENCOUNTER — DOCUMENTATION ONLY (OUTPATIENT)
Dept: TRANSPLANT | Facility: CLINIC | Age: 68
End: 2018-09-20

## 2018-09-20 NOTE — NURSING
PHARM.D. PRE-TRANSPLANT NOTE:    This patient's medication therapy was evaluated as part of his pre-transplant evaluation.    The following pharmacologic concerns were noted: none    This patient's medication profile was reviewed for contraindications for DAA Hepatitis C therapy:    [X]  No current inducers of CYP 3A4 or PGP  [X]  No amiodarone on this patient's EMR profile in the last 24 months  [X]  No past or current atrial fibrillation on this patient's EMR profile       Current Outpatient Medications   Medication Sig Dispense Refill    amiodarone (PACERONE) 200 MG Tab Take by mouth once daily.      aspirin (ECOTRIN) 81 MG EC tablet Take 81 mg by mouth once daily.      atorvastatin (LIPITOR) 40 MG tablet Take 40 mg by mouth once daily.      ferrous sulfate 325 (65 FE) MG EC tablet Take 325 mg by mouth once daily.      furosemide (LASIX) 40 MG tablet Take 40 mg by mouth once daily.      hydrALAZINE (APRESOLINE) 100 MG tablet Take 100 mg by mouth 3 (three) times daily.      levothyroxine (SYNTHROID) 25 MCG tablet Take 25 mcg by mouth once daily.      linagliptin (TRADJENTA) 5 mg Tab tablet Take 5 mg by mouth once daily.      metoprolol succinate (TOPROL-XL) 200 MG 24 hr tablet Take 200 mg by mouth once daily.      NIFEdipine (PROCARDIA-XL) 90 MG (OSM) 24 hr tablet Take 90 mg by mouth once daily.      spironolactone (ALDACTONE) 50 MG tablet Take 50 mg by mouth once daily.       No current facility-administered medications for this visit.          I am available for consultation and can be contacted, as needed by the other members of the Kidney Transplant team.

## 2018-09-21 ENCOUNTER — COMMITTEE REVIEW (OUTPATIENT)
Dept: TRANSPLANT | Facility: CLINIC | Age: 68
End: 2018-09-21

## 2018-09-21 NOTE — COMMITTEE REVIEW
Native Organ Dx: Hypertensive Nephrosclerosis      Not approved for LRD/CAD transplant due to active foot wound,  severe iliac calcifications, obesity, and heart disease. Patient is also with advanced age, no living donors, pre dialysis which may prolong wait time on the list.     Spoke to patient, informed him of committee's decision. Pt voiced understanding.     Note written by Nhung Elliott RN    ===============================================    I was present at the meeting and attest to the decision of the committee.    Cristian Langley  09/21/2018

## 2018-09-21 NOTE — LETTER
September 21, 2018    Julius Suazo  [address] [place] [address]        Dear Julius cherry:  MRN: [id number]    It is the duty of the [facility] Selection Committee to determine which patients are candidates for a transplant. For this reason, our committee has the difficult task of evaluating patients to determine which ones have the greatest chance of having a successful transplant. We are aware of the magnitude of this responsibility, and we approach it with reverence and humility.    It is with regret I inform you that you are not approved as a transplant candidate due to active foot ulcer, severe iliac calcifications, obesity, and heart disease.  Your future transplant appointments for kidney transplant have been canceled.  Based on this review, we have determined that at this time, you are not a candidate for a transplant at [facility].      The selection committee carefully considers each patient's transplant candidacy and determines whether it is safe to proceed with transplantation on a case-by-case basis using established selection criteria.  At present, the risk of proceeding with an elective transplant surgery has become too high.                                                                               Although the selection committee believes you are not a suitable transplant candidate, you have the option to be evaluated at other transplant centers who may have different selection criteria.  You may request your [facility] records be sent to any center of your choice by contacting our Medical Records Department at [phone].                                                                               Attached is a letter from the [employer] ([employer]).  It describes the services and information offered to patients by UN[employer] and the Organ Procurement and Transplant Network.    The [facility] sincerely wishes you the best and remains  available to answer any questions.  Please do not hesitate to contact our pre-transplant office if we can assist you in any other way.                                                                               Sincerely,      Nidhi Fitzgerald MD  Medical Director, Kidney & Kidney/Pancreas Transplantation    Cc: Dr. Brooks    Encl: UNOS Letter          OPTN/UNOS: Your Resource for Organ Transplant Information        If you have a question regarding your own medical care, you always should call your transplant center first. However, for general organ transplant-related information, you can call the [employer] ([employer]) toll-free patient services line at [phone].    Anyone, including potential transplant candidates, recipients, family members/friends, living donors, and/or donor family members can call this number to:    · talk about organ donation, living donation, how transplant and donation work, the donation process, transplant policies, and transplant/donor information;  · get a free patient information kit with helpful booklets, waiting list and transplant information, and a list of all transplant centers;  · ask questions about the Organ Procurement and Transplantation Network (OPTN) web site (www.optn.transplant.hrsa.gov); the [employer]OS Web site (www.unos.org); or the UNOS web site for living donors and transplant recipients (www.transplantliving.org);  · learn how [employer] and the OPTN can help you;  · talk about any concerns that you may have with a transplant center and how they perform    UN[employer] is a not-for-profit organization that provides all of the administrative services for the national OPTN under federal contract to the Health Resources and Services Administration (HRSA), an agency under the [place]. Department of Health and Human Services (HHS).     [employer] and OPTN responsibilities include:    · writing educational material for patients, the public and professionals;  · helping  to make people aware of the need for donated organs and tissue;  · writing organ transplant policy with help from doctors, nurses, transplant patients/candidates, donor families, living donors, and the public;  · coordinating the organ matching and placement process;  · collecting information about every organ transplant and donation that occurs in the [place].    Remember, you should contact your transplant center directly if you have questions or concerns about your own medical care including medical records, work-up progress and test reports. [facility] is not your transplant center, and staff at [facility] will not be able to transfer you to your transplant center, so keep your transplant centers phone number handy. But, while you research your transplant needs and learn as much as you can about transplantation and donation, we welcome your call to our toll-free patient services line at [phone].